# Patient Record
Sex: FEMALE | Race: WHITE | NOT HISPANIC OR LATINO | Employment: UNEMPLOYED | URBAN - METROPOLITAN AREA
[De-identification: names, ages, dates, MRNs, and addresses within clinical notes are randomized per-mention and may not be internally consistent; named-entity substitution may affect disease eponyms.]

---

## 2020-03-16 ENCOUNTER — TELEPHONE (OUTPATIENT)
Dept: NEUROLOGY | Facility: CLINIC | Age: 60
End: 2020-03-16

## 2020-03-16 NOTE — TELEPHONE ENCOUNTER
PT IS REQUESTING TO BRING A DISC IN OF ALL OF HER PREVIOUS MRIS. SHE SAID SHE HAD ONE DONE IN October OF 2019 AND ANOTHER ONE THAT SHE IS UNSURE WHAT THE DATE IS. SHE SAID SHE WANTS THE DR TO LOOK OVER AND COMPARE THEM AND WANTED TO KNOW IF THAT WAS SOMETHING SHE WOULD BE ABLE TO DO ON HER APPT ON MAY 6TH. SHE SAID SHE HAS SEEN A PREVIOUS NEUROLOGIST AND DON'T BELIEVE HE LOOKED AT THE DISC. THERE IS ALREADY ONE MRI FROM 3/12/20 IN HER CHART.    BEST CALL BACK - [phone]

## 2020-05-13 ENCOUNTER — TELEMEDICINE (OUTPATIENT)
Dept: NEUROLOGY | Facility: CLINIC | Age: 60
End: 2020-05-13

## 2020-05-13 DIAGNOSIS — G44.209 TENSION HEADACHE: Primary | ICD-10-CM

## 2020-05-13 PROCEDURE — 99204 OFFICE O/P NEW MOD 45 MIN: CPT | Performed by: PSYCHIATRY & NEUROLOGY

## 2020-05-13 RX ORDER — GABAPENTIN 300 MG/1
300 CAPSULE ORAL 3 TIMES DAILY
COMMUNITY
End: 2020-11-05

## 2020-05-13 RX ORDER — ATORVASTATIN CALCIUM 40 MG/1
40 TABLET, FILM COATED ORAL DAILY
COMMUNITY

## 2020-05-13 RX ORDER — CLONAZEPAM 1 MG/1
1 TABLET ORAL 2 TIMES DAILY PRN
COMMUNITY

## 2020-05-13 RX ORDER — LEVOTHYROXINE SODIUM 0.1 MG/1
100 TABLET ORAL DAILY
COMMUNITY

## 2020-05-13 RX ORDER — ISOSORBIDE MONONITRATE 30 MG/1
30 TABLET, EXTENDED RELEASE ORAL DAILY
COMMUNITY

## 2020-05-13 RX ORDER — BUPROPION HYDROCHLORIDE 150 MG/1
150 TABLET ORAL DAILY
COMMUNITY

## 2020-05-13 RX ORDER — SUCRALFATE 1 G/1
1 TABLET ORAL 4 TIMES DAILY
COMMUNITY
End: 2020-11-05

## 2020-05-13 RX ORDER — HYDROCODONE BITARTRATE AND ACETAMINOPHEN 10; 325 MG/1; MG/1
1 TABLET ORAL EVERY 6 HOURS PRN
COMMUNITY

## 2020-05-13 NOTE — PROGRESS NOTES
Subjective:    CC: Randi Conway is seen today via video visit in consultation at the request of Melissa Candis Collett,* for headache and abnormal MRI      HPI:  60 year old female with a history of anxiety, depression, arthritis, WPW syndrome, atrial fibrillation, GOLDY on CPAP, hypertension, neuropathy hyperlipidemia presents with headaches.  As per patient she started having a dull pain in her left temple few months ago.  She currently has the pain every day but occasionally it gets severe and may be accompanied by nausea, blurred vision as well as seeing flashes of light in both eyes and a feeling of weakness in both legs.  Avoids taking any over-the-counter medications for this.  She does report to poor quality of sleep at night due to her feet hurting despite taking gabapentin and Norco.  She also has sleep apnea and is compliant with her CPAP but was also told to use oxygen for hypoxia as her PO2 was dropping in the 60s but does not do so.  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  Patient states that these headaches may be related to that.  She had a MRI brain in March that showed chronic ischemic changes and a small right periventricular chronic infarct but no acute abnormalities.    The following portions of the patient's history were reviewed today and updated as of 05/13/2020  : allergies, current medications, past family history, past medical history, past social history, past surgical history and problem list  These document will be scanned to patient's chart.      Current Outpatient Medications:   •  apixaban (ELIQUIS) 5 MG tablet tablet, Take 5 mg by mouth 2 (Two) Times a Day., Disp: , Rfl:   •  atorvastatin (LIPITOR) 40 MG tablet, Take 40 mg by mouth Daily., Disp: , Rfl:   •  buPROPion XL (WELLBUTRIN XL) 150 MG 24 hr tablet, Take 150 mg by mouth Daily., Disp: , Rfl:   •  clonazePAM (KlonoPIN) 1 MG tablet, Take 1 mg by mouth 2 (Two) Times a Day As Needed., Disp: , Rfl:    •  gabapentin (NEURONTIN) 300 MG capsule, Take 300 mg by mouth 3 (Three) Times a Day., Disp: , Rfl:   •  HYDROcodone-acetaminophen (NORCO)  MG per tablet, Take 1 tablet by mouth Every 6 (Six) Hours As Needed., Disp: , Rfl:   •  isosorbide mononitrate (IMDUR) 30 MG 24 hr tablet, Take 30 mg by mouth Daily., Disp: , Rfl:   •  levothyroxine (SYNTHROID, LEVOTHROID) 100 MCG tablet, Take 100 mcg by mouth Daily., Disp: , Rfl:   •  sucralfate (CARAFATE) 1 g tablet, Take 1 g by mouth 4 (Four) Times a Day., Disp: , Rfl:    No past medical history on file.   No past surgical history on file.   No family history on file.   Social History     Socioeconomic History   • Marital status: Single     Spouse name: Not on file   • Number of children: Not on file   • Years of education: Not on file   • Highest education level: Not on file     Review of Systems   Eyes: Positive for visual disturbance.   Neurological: Positive for headache.   All other systems reviewed and are negative.      Objective:    There were no vitals taken for this visit.    Neurology Exam:    General apperance: Obese    Mental status: Alert, awake and oriented to time place and person.    Recent and Remote memory: Intact.    Attention span and Concentration: Normal.     Language and Speech: Intact- No dysarthria.    Fluency, Naming , Repitition and Comprehension:  Intact    Cranial Nerves:   CN II: Visual fields are full. Intact. Fundi - Normal, No papillederma, Pupils - ADOLFO  CN III, IV and VI: Extraocular movements are intact. Normal saccades.   CN V: Facial sensation is intact.   CN VII: Muscles of facial expression reveal no asymmetry. Intact.   CN VIII: Hearing is intact. Whispered voice intact.   CN IX and X: Palate elevates symmetrically. Intact  CN XI: Shoulder shrug is intact.   CN XII: Tongue is midline without evidence of atrophy or fasciculation.       Motor:  Strength-normal    Gait: Deferred      Assessment and Plan:  1. Tension headache  I  feel patient has a combination of migraine and tension type headaches however due to her age I would like to rule out temporal arteritis.  We will get her ESR and CRP (she states her CRP was previously elevated due to arthritis for which she is now getting shots)  -I have also told her to get her MRI brain disc at her next visit so that I can review it personally.  I explained to her that small vessel ischemic changes could be seen due to underlying vascular risk factors.  It did not mean that she had MS like she was previously told  -We will defer starting her on any medications yet    - Sedimentation Rate; Future  - C-reactive Protein; Future       Return in about 4 weeks (around 6/10/2020).     I spent over 45 minutes with the patient face to face via video visit out of which over 50% (30 minutes) was spent in management, instructions and education.     Estela Cam MD

## 2020-11-05 ENCOUNTER — OFFICE VISIT (OUTPATIENT)
Dept: NEUROLOGY | Facility: CLINIC | Age: 60
End: 2020-11-05

## 2020-11-05 VITALS
WEIGHT: 196 LBS | BODY MASS INDEX: 34.73 KG/M2 | SYSTOLIC BLOOD PRESSURE: 108 MMHG | DIASTOLIC BLOOD PRESSURE: 74 MMHG | TEMPERATURE: 97.1 F | HEART RATE: 73 BPM | OXYGEN SATURATION: 98 % | HEIGHT: 63 IN

## 2020-11-05 DIAGNOSIS — G44.209 TENSION HEADACHE: Primary | ICD-10-CM

## 2020-11-05 PROCEDURE — 99214 OFFICE O/P EST MOD 30 MIN: CPT | Performed by: PSYCHIATRY & NEUROLOGY

## 2020-11-05 RX ORDER — LEVOTHYROXINE SODIUM 137 UG/1
TABLET ORAL
COMMUNITY
Start: 2020-09-22 | End: 2020-11-05

## 2020-11-05 RX ORDER — DICYCLOMINE HCL 20 MG
TABLET ORAL
COMMUNITY
Start: 2020-10-19

## 2020-11-05 RX ORDER — FAMOTIDINE 40 MG/1
TABLET, FILM COATED ORAL
COMMUNITY
Start: 2020-10-19

## 2020-11-05 RX ORDER — PROPAFENONE HYDROCHLORIDE 150 MG/1
TABLET, COATED ORAL
COMMUNITY
Start: 2020-10-19

## 2020-11-05 RX ORDER — GABAPENTIN 600 MG/1
TABLET ORAL
COMMUNITY
Start: 2020-10-17

## 2020-11-05 RX ORDER — BUPROPION HYDROCHLORIDE 300 MG/1
TABLET ORAL
COMMUNITY
Start: 2020-10-19 | End: 2020-11-05

## 2020-11-05 RX ORDER — AMITRIPTYLINE HYDROCHLORIDE 10 MG/1
10 TABLET, FILM COATED ORAL NIGHTLY
Qty: 30 TABLET | Refills: 5 | Status: SHIPPED | OUTPATIENT
Start: 2020-11-05

## 2020-11-05 NOTE — PROGRESS NOTES
Subjective:    CC: Randi Conway is seen today  for headache and abnormal MRI      HPI:  Current visit-patient last saw me in May.  She states that she continues to have dull headaches mainly on the left side of her head with mild weakness of the left side.  Denies having a severe headache in the past few weeks.  She tries not to take any over-the-counter medications for headaches.  Also denies having any progressive loss of vision with the headaches however she did have cataract surgery a few months ago after which her vision improved but soon afterwards it started deteriorating again.  Had a lens cleaning procedure however she still sees shadows in her field of vision.  Denies any muscle weakness or pain, scalp tenderness or jaw claudication but does feel extremely tired especially in the mornings.  Has been diagnosed with fibromyalgia in the past.  Her daughter was recently diagnosed with several autoimmune diseases including lupus RA and Sjogren's.  Patient had an ESR and CRP recently.  ESR was mildly elevated at 22 and CRP was normal.  Even in the past her CRP has been extremely high which has been attributed to her arthritis.  With regards to her sleep apnea she has recently stopped using her CPAP as it causes burning of her nose.  She reports despite sleeping only 4 to 5 hours each night and is up because of her neuropathy despite taking gabapentin and opiates.  Of note-I personally reviewed her MRI brain done in March 2020.  It showed minimal chronic ischemic changes but no acute abnormalities.  Also there is no evidence of her previously resected left bone tumor    Initial visit-60 year old female with a history of anxiety, depression, arthritis, WPW syndrome, atrial fibrillation, GOLDY on CPAP, hypertension, neuropathy hyperlipidemia presents with headaches.  As per patient she started having a dull pain in her left temple few months ago.  She currently has the pain every day but occasionally it gets  severe and may be accompanied by nausea, blurred vision as well as seeing flashes of light in both eyes and a feeling of weakness in both legs.  Avoids taking any over-the-counter medications for this.  She does report to poor quality of sleep at night due to her feet hurting despite taking gabapentin and Norco.  She also has sleep apnea and is compliant with her CPAP but was also told to use oxygen for hypoxia as her PO2 was dropping in the 60s but does not do so.  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  Patient states that these headaches may be related to that.  She had a MRI brain in March that showed chronic ischemic changes and a small right periventricular chronic infarct but no acute abnormalities.    The following portions of the patient's history were reviewed today and updated as of 05/13/2020  : allergies, current medications, past family history, past medical history, past social history, past surgical history and problem list  These document will be scanned to patient's chart.      Current Outpatient Medications:   •  apixaban (ELIQUIS) 5 MG tablet tablet, Take 5 mg by mouth 2 (Two) Times a Day., Disp: , Rfl:   •  atorvastatin (LIPITOR) 40 MG tablet, Take 40 mg by mouth Daily., Disp: , Rfl:   •  buPROPion XL (WELLBUTRIN XL) 150 MG 24 hr tablet, Take 150 mg by mouth Daily., Disp: , Rfl:   •  clonazePAM (KlonoPIN) 1 MG tablet, Take 1 mg by mouth 2 (Two) Times a Day As Needed., Disp: , Rfl:   •  dicyclomine (BENTYL) 20 MG tablet, , Disp: , Rfl:   •  famotidine (PEPCID) 40 MG tablet, , Disp: , Rfl:   •  gabapentin (NEURONTIN) 600 MG tablet, , Disp: , Rfl:   •  HYDROcodone-acetaminophen (NORCO)  MG per tablet, Take 1 tablet by mouth Every 6 (Six) Hours As Needed., Disp: , Rfl:   •  isosorbide mononitrate (IMDUR) 30 MG 24 hr tablet, Take 30 mg by mouth Daily., Disp: , Rfl:   •  levothyroxine (SYNTHROID, LEVOTHROID) 100 MCG tablet, Take 100 mcg by mouth Daily., Disp: ,  "Rfl:   •  propafenone (RYTHMOL) 150 MG tablet, , Disp: , Rfl:    Past Medical History:   Diagnosis Date   • Cluster headache    • Headache, tension-type    • Migraine       Past Surgical History:   Procedure Laterality Date   •  SECTION     • EXPLORATORY LAPAROTOMY     • LIPOMA EXCISION     • SHOULDER LIGAMENT REPAIR     • TUBAL ABDOMINAL LIGATION     • TUMOR EXCISION        Family History   Problem Relation Age of Onset   • Stroke Mother    • Stroke Maternal Grandmother       Social History     Socioeconomic History   • Marital status: Single     Spouse name: Not on file   • Number of children: Not on file   • Years of education: Not on file   • Highest education level: Not on file   Tobacco Use   • Smoking status: Never Smoker   • Smokeless tobacco: Never Used   Substance and Sexual Activity   • Alcohol use: Never     Frequency: Never   • Drug use: Never     Review of Systems   Eyes: Positive for visual disturbance.   Neurological: Positive for headache.   All other systems reviewed and are negative.      Objective:    /74   Pulse 73   Temp 97.1 °F (36.2 °C)   Ht 160 cm (63\")   Wt 88.9 kg (196 lb)   SpO2 98%   BMI 34.72 kg/m²     Neurology Exam:    General apperance: Obese, good temporal artery pulsations bilaterally with no scalp tenderness or jaw claudication.  Also no tenderness to palpation of her muscles anywhere    Mental status: Alert, awake and oriented to time place and person.    Recent and Remote memory: Intact.    Attention span and Concentration: Normal.     Language and Speech: Intact- No dysarthria.    Fluency, Naming , Repitition and Comprehension:  Intact    Cranial Nerves:   CN II: Visual fields are full. Intact. Fundi - Normal, No papillederma, Pupils - ADOLFO  CN III, IV and VI: Extraocular movements are intact. Normal saccades.   CN V: Facial sensation is intact.   CN VII: Muscles of facial expression reveal no asymmetry. Intact.   CN VIII: Hearing is intact. Whispered voice " intact.   CN IX and X: Palate elevates symmetrically. Intact  CN XI: Shoulder shrug is intact.   CN XII: Tongue is midline without evidence of atrophy or fasciculation.       Motor:  Strength-normal    Sensory normal    DTR 2+ bilaterally in upper and lower extremities    Gait: Deferred      Assessment and Plan:  1. Tension headache  Patient most likely has tension type headaches  I will prescribe her low doses of amitriptyline 10 mg at night that will hopefully help with her sleep and her headaches.  It may also help with her knee neuropathic symptoms  I have also told her to be compliant with her CPAP    2.  Fatigue  I have told her to get a rheumatology referral for further work-up to rule out autoimmune diseases.  She has been diagnosed with fibromyalgia in the past       Return in about 6 weeks (around 12/17/2020).         Estela Cam MD

## 2022-06-30 DIAGNOSIS — M25.512 LEFT SHOULDER PAIN, UNSPECIFIED CHRONICITY: Primary | ICD-10-CM
